# Patient Record
Sex: FEMALE | Race: BLACK OR AFRICAN AMERICAN | NOT HISPANIC OR LATINO | Employment: UNEMPLOYED | URBAN - METROPOLITAN AREA
[De-identification: names, ages, dates, MRNs, and addresses within clinical notes are randomized per-mention and may not be internally consistent; named-entity substitution may affect disease eponyms.]

---

## 2019-05-21 ENCOUNTER — SOCIAL WORK (OUTPATIENT)
Dept: ADMINISTRATIVE | Facility: OTHER | Age: 29
End: 2019-05-21

## 2019-05-21 NOTE — PROGRESS NOTES
ANDIE received paperwork from [employer] regarding MD signature for pt a pelvic support brace. SW provide MD the paperwork for his signature. SW later received the completed paperwork and fax to([phone]). ANDIE scanned paperwork into epic.     PARESH Barkley   Pager#[phone]

## 2021-05-12 ENCOUNTER — PATIENT MESSAGE (OUTPATIENT)
Dept: RESEARCH | Facility: HOSPITAL | Age: 31
End: 2021-05-12

## 2021-06-03 PROBLEM — I10 HYPERTENSION: Status: ACTIVE | Noted: 2021-06-03

## 2021-06-03 PROBLEM — Z91.199 NONCOMPLIANCE: Status: ACTIVE | Noted: 2021-06-03

## 2021-06-03 PROBLEM — O09.30 LATE PRENATAL CARE: Status: ACTIVE | Noted: 2021-06-03

## 2021-06-03 PROBLEM — Z98.891 PREVIOUS CESAREAN SECTION: Status: ACTIVE | Noted: 2021-06-03

## 2021-06-03 PROBLEM — Z87.59 HISTORY OF PRE-ECLAMPSIA: Status: ACTIVE | Noted: 2021-06-03

## 2021-06-07 PROBLEM — A59.9 TRICHIMONIASIS: Status: ACTIVE | Noted: 2021-06-07

## 2021-07-08 PROBLEM — Z3A.33 33 WEEKS GESTATION OF PREGNANCY: Status: ACTIVE | Noted: 2021-07-08

## 2021-07-20 PROBLEM — O11.9 CHRONIC HYPERTENSION WITH SUPERIMPOSED PRE-ECLAMPSIA: Status: ACTIVE | Noted: 2021-07-20

## 2021-07-20 PROBLEM — Z3A.34 34 WEEKS GESTATION OF PREGNANCY: Status: ACTIVE | Noted: 2021-07-08

## 2021-07-21 PROBLEM — O14.13 SEVERE PRE-ECLAMPSIA IN THIRD TRIMESTER: Status: ACTIVE | Noted: 2021-07-20

## 2021-07-21 PROBLEM — Z98.891 HISTORY OF VBAC: Status: ACTIVE | Noted: 2021-07-21

## 2021-07-22 PROBLEM — O34.219 VBAC (VAGINAL BIRTH AFTER CESAREAN): Status: ACTIVE | Noted: 2021-07-22

## 2021-07-28 PROBLEM — O14.90 PREECLAMPSIA: Status: ACTIVE | Noted: 2021-07-28

## 2022-06-27 PROBLEM — Z3A.34 34 WEEKS GESTATION OF PREGNANCY: Status: RESOLVED | Noted: 2021-07-08 | Resolved: 2022-06-27

## 2023-02-27 PROBLEM — O14.90 PREECLAMPSIA: Status: RESOLVED | Noted: 2021-07-28 | Resolved: 2023-02-27

## 2023-02-27 PROBLEM — Z87.59 HISTORY OF PRE-ECLAMPSIA: Status: RESOLVED | Noted: 2021-06-03 | Resolved: 2023-02-27

## 2023-02-27 PROBLEM — O09.293 HISTORY OF PRE-ECLAMPSIA IN PRIOR PREGNANCY, CURRENTLY PREGNANT IN THIRD TRIMESTER: Status: ACTIVE | Noted: 2021-07-20

## 2023-03-10 PROBLEM — O11.9 CHRONIC HYPERTENSION WITH SUPERIMPOSED PREECLAMPSIA: Status: ACTIVE | Noted: 2023-03-10

## 2023-03-27 PROBLEM — O16.3 ELEVATED BLOOD PRESSURE AFFECTING PREGNANCY IN THIRD TRIMESTER, ANTEPARTUM: Status: ACTIVE | Noted: 2023-03-27

## 2023-03-27 PROBLEM — O16.3 HYPERTENSION AFFECTING PREGNANCY IN THIRD TRIMESTER: Status: ACTIVE | Noted: 2023-03-27

## 2023-03-27 PROBLEM — O09.33 LIMITED PRENATAL CARE IN THIRD TRIMESTER: Status: ACTIVE | Noted: 2023-03-27

## 2023-03-27 PROBLEM — O14.13 SEVERE PRE-ECLAMPSIA IN THIRD TRIMESTER: Status: ACTIVE | Noted: 2021-07-28

## 2023-03-27 PROBLEM — O09.93 SUPERVISION OF HIGH-RISK PREGNANCY, THIRD TRIMESTER: Status: ACTIVE | Noted: 2023-03-27

## 2023-03-27 PROBLEM — Z3A.36 36 WEEKS GESTATION OF PREGNANCY: Status: ACTIVE | Noted: 2023-03-27

## 2023-03-27 PROBLEM — Z3A.35 35 WEEKS GESTATION OF PREGNANCY: Status: ACTIVE | Noted: 2023-03-27

## 2024-02-05 PROBLEM — O09.293 HISTORY OF PRE-ECLAMPSIA IN PRIOR PREGNANCY, CURRENTLY PREGNANT IN THIRD TRIMESTER: Status: RESOLVED | Noted: 2021-07-20 | Resolved: 2024-02-05

## 2024-03-04 PROBLEM — Z3A.35 35 WEEKS GESTATION OF PREGNANCY: Status: RESOLVED | Noted: 2023-03-27 | Resolved: 2024-03-04

## 2024-03-04 PROBLEM — Z3A.36 36 WEEKS GESTATION OF PREGNANCY: Status: RESOLVED | Noted: 2023-03-27 | Resolved: 2024-03-04
